# Patient Record
Sex: MALE | Race: WHITE | NOT HISPANIC OR LATINO | Employment: FULL TIME | URBAN - METROPOLITAN AREA
[De-identification: names, ages, dates, MRNs, and addresses within clinical notes are randomized per-mention and may not be internally consistent; named-entity substitution may affect disease eponyms.]

---

## 2018-03-31 ENCOUNTER — OFFICE VISIT (OUTPATIENT)
Dept: URGENT CARE | Facility: CLINIC | Age: 43
End: 2018-03-31

## 2018-03-31 VITALS
SYSTOLIC BLOOD PRESSURE: 114 MMHG | HEART RATE: 86 BPM | OXYGEN SATURATION: 99 % | TEMPERATURE: 97.8 F | WEIGHT: 205 LBS | DIASTOLIC BLOOD PRESSURE: 76 MMHG | BODY MASS INDEX: 32.95 KG/M2 | HEIGHT: 66 IN

## 2018-03-31 DIAGNOSIS — H61.22 IMPACTED CERUMEN OF LEFT EAR: ICD-10-CM

## 2018-03-31 DIAGNOSIS — H91.93 HEARING DIFFICULTY OF BOTH EARS: ICD-10-CM

## 2018-03-31 DIAGNOSIS — H72.92 PERFORATION OF LEFT TYMPANIC MEMBRANE: ICD-10-CM

## 2018-03-31 PROCEDURE — 99204 OFFICE O/P NEW MOD 45 MIN: CPT | Performed by: FAMILY MEDICINE

## 2018-03-31 ASSESSMENT — PAIN SCALES - GENERAL: PAINLEVEL: NO PAIN

## 2018-04-01 ASSESSMENT — ENCOUNTER SYMPTOMS
DIZZINESS: 0
HEADACHES: 0
NAUSEA: 0
EYE PAIN: 0
RESPIRATORY NEGATIVE: 1
PSYCHIATRIC NEGATIVE: 1
CARDIOVASCULAR NEGATIVE: 1
FEVER: 0
EYE REDNESS: 0
VOMITING: 0
EYE DISCHARGE: 0
MUSCULOSKELETAL NEGATIVE: 1

## 2018-04-01 NOTE — PROGRESS NOTES
"Subjective:      Tiago Peace is a 43 y.o. male who presents with Hearing Loss (x4 days. Pt complains of hearing loss in Lt ear. 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  He denies any pain. No fevers or chills. No d/c from the ear.  No trauma.  He states that he is already deaf in the Right Ear. HE has not seen an ENT. No sore throat or cough or nasal congestion. No problems with dizziness. No tinitus no headache/         HPI  Review of Systems   Constitutional: Negative for fever.   HENT: Positive for hearing loss. Negative for congestion, ear discharge, ear pain and tinnitus.    Eyes: Negative for pain, discharge and redness.   Respiratory: Negative.    Cardiovascular: Negative.    Gastrointestinal: Negative for nausea and vomiting.   Genitourinary: Negative.    Musculoskeletal: Negative.    Skin: Negative.    Neurological: Negative for dizziness and headaches.   Endo/Heme/Allergies: Negative.    Psychiatric/Behavioral: Negative.      PMH:  has no past medical history of Asthma.  MEDS:   Current Outpatient Prescriptions:   •  atovaquone-proguanil (MALARONE) 250-100 MG per tablet, Take 1 Tab by mouth every day., Disp: 15 Tab, Rfl: 0  •  ciprofloxacin (CIPRO) 500 MG TABS, Take 1 Tab by mouth 2 times a day., Disp: 20 Tab, Rfl: 0  ALLERGIES: No Known Allergies  SURGHX: History reviewed. No pertinent surgical history.  SOCHX:  reports that he has never smoked. He has never used smokeless tobacco. He reports that he drinks alcohol. He reports that he does not use drugs.  FH: Family history was reviewed, no pertinent findings to report     Objective:     /76   Pulse 86   Temp 36.6 °C (97.8 °F)   Ht 1.676 m (5' 6\")   Wt 93 kg (205 lb)   SpO2 99%   BMI 33.09 kg/m²      Physical Exam   Constitutional: He is oriented to person, place, and time. He appears well-developed and well-nourished. No distress.   HENT: "   Mouth/Throat: Oropharynx is clear and moist.   Initially left tm obscured with cerumen. After removal of cerumen with scupula then a perforated tm was visible there was a half moon shaped tm present on the left side of the inner ear with a scarred area retracted down. No active d/c noted      Right tm opaque with mild erythema ext canal wnl   Eyes: Conjunctivae are normal.   Neck: Normal range of motion.   Cardiovascular: Normal rate, regular rhythm, normal heart sounds and intact distal pulses.  Exam reveals no gallop and no friction rub.    No murmur heard.  Pulmonary/Chest: Effort normal and breath sounds normal. No respiratory distress. He has no wheezes. He has no rales. He exhibits no tenderness.   Musculoskeletal: Normal range of motion.   Neurological: He is alert and oriented to person, place, and time.   Skin: Skin is warm and dry. He is not diaphoretic.   Psychiatric: He has a normal mood and affect. His behavior is normal. Judgment and thought content normal.       Procedures: cerumen removal with scupula     Assessment/Plan:     1. Perforation of left tympanic membrane  REFERRAL TO ENT   2. Hearing difficulty of both ears  REFERRAL TO ENT   3. Impacted cerumen of left ear       Cautioned to puting anything in left ear. Cautioned for fevers and chills as well.

## 2022-01-10 PROBLEM — M23.322 MEDIAL MENISCUS, POSTERIOR HORN DERANGEMENT, LEFT: Status: ACTIVE | Noted: 2022-01-10

## 2022-01-18 PROBLEM — M24.022 LOOSE BODY OF LEFT ELBOW: Status: ACTIVE | Noted: 2022-01-18

## 2022-01-18 PROBLEM — S83.232A COMPLEX TEAR OF MEDIAL MENISCUS, CURRENT INJURY, LEFT KNEE, INITIAL ENCOUNTER: Status: ACTIVE | Noted: 2022-01-18

## 2022-01-24 PROBLEM — G89.18 POSTOPERATIVE PAIN: Status: ACTIVE | Noted: 2022-01-24
